# Patient Record
Sex: MALE | Race: WHITE | Employment: FULL TIME | URBAN - METROPOLITAN AREA
[De-identification: names, ages, dates, MRNs, and addresses within clinical notes are randomized per-mention and may not be internally consistent; named-entity substitution may affect disease eponyms.]

---

## 2017-07-11 ENCOUNTER — TELEPHONE (OUTPATIENT)
Dept: FAMILY MEDICINE | Facility: CLINIC | Age: 72
End: 2017-07-11

## 2017-07-11 NOTE — TELEPHONE ENCOUNTER
Panel Management Review      Patient has the following on his problem list:     Hypertension   Last three blood pressure readings:  BP Readings from Last 3 Encounters:   12/06/16 (!) 146/104   11/02/16 126/88   10/25/16 124/90     Blood pressure: FAILED    HTN Guidelines:  Age 18-59 BP range:  Less than 140/90  Age 60-85 with Diabetes:  Less than 140/90  Age 60-85 without Diabetes:  less than 150/90      Composite cancer screening  Chart review shows that this patient is due/due soon for the following None  Summary:    Patient is due/failing the following:   hypertension    Action needed:   Patient needs office visit for htn.    Type of outreach:    Sent letter.    Questions for provider review:    None                                                                                                                                    Tanisha BROOKE CMA ([facility])

## 2017-07-11 NOTE — LETTER
[address]  [place] [address]  Phone: [phone]            July 11, 2017          Surya Dior,  [address]  [facility] [address]        Dear Surya Dior      Monitoring and managing your preventative and chronic health conditions are very important to us. Our records indicate that you have not scheduled for Appointment with your provider for Blood Pressure which was recommended by Alona Benítez.      If you have received your health care elsewhere, please call the clinic so the information can be documented in your chart.    Please call [phone] or message us through your [employer] account to schedule an appointment or provide information for your chart.     Feel free to contact us if you have any questions or concerns!    I look forward to seeing you and working with you on your health care needs.     Sincerely,         Alona Benítez / Tanisha BROOKE CMA ([facility])

## 2017-10-17 ENCOUNTER — TELEPHONE (OUTPATIENT)
Dept: INTERNAL MEDICINE | Facility: CLINIC | Age: 72
End: 2017-10-17

## 2017-10-17 NOTE — LETTER
October 17, 2017          Surya Dior,  [address] [facility] [address]        Dear Surya Dior      Monitoring and managing your preventative and chronic health conditions are very important to us. Our records indicate that you have not scheduled for Appointment with your provider for blood pressure recheck  which was recommended by Alona Benítez.      If you have received your health care elsewhere, please call the clinic so the information can be documented in your chart.    Please call [phone] or message us through your [employer] account to schedule an appointment or provide information for your chart.     Feel free to contact us if you have any questions or concerns!    I look forward to seeing you and working with you on your health care needs.     Sincerely,         Alona Benítez / Tanisha BROOKE CMA ([facility])

## 2017-10-17 NOTE — TELEPHONE ENCOUNTER
Panel Management Review      Patient has the following on his problem list:     Hypertension   Last three blood pressure readings:  BP Readings from Last 3 Encounters:   12/06/16 (!) 146/104   11/02/16 126/88   10/25/16 124/90     Blood pressure: FAILED    HTN Guidelines:  Age 18-59 BP range:  Less than 140/90  Age 60-85 with Diabetes:  Less than 140/90  Age 60-85 without Diabetes:  less than 150/90        Composite cancer screening  Chart review shows that this patient is due/due soon for the following None  Summary:    Patient is due/failing the following:   BP CHECK, DAP and PHQ9    Action needed:   Patient needs office visit for Hypertension follow up.    Type of outreach:    Sent letter.    Questions for provider review:    None                                                                                                                                    Tanisha BROOKE CMA ([facility])       Chart routed to  .

## 2018-04-11 ENCOUNTER — OFFICE VISIT (OUTPATIENT)
Dept: UROLOGY | Facility: CLINIC | Age: 73
End: 2018-04-11
Payer: COMMERCIAL

## 2018-04-11 VITALS
DIASTOLIC BLOOD PRESSURE: 100 MMHG | WEIGHT: 210 LBS | OXYGEN SATURATION: 96 % | HEART RATE: 83 BPM | SYSTOLIC BLOOD PRESSURE: 152 MMHG | HEIGHT: 71 IN | BODY MASS INDEX: 29.4 KG/M2

## 2018-04-11 DIAGNOSIS — R35.0 BENIGN PROSTATIC HYPERPLASIA WITH URINARY FREQUENCY: Primary | ICD-10-CM

## 2018-04-11 DIAGNOSIS — N40.1 BENIGN PROSTATIC HYPERPLASIA WITH URINARY FREQUENCY: Primary | ICD-10-CM

## 2018-04-11 LAB
ALBUMIN UR-MCNC: NEGATIVE MG/DL
APPEARANCE UR: CLEAR
BILIRUB UR QL STRIP: NEGATIVE
COLOR UR AUTO: YELLOW
GLUCOSE UR STRIP-MCNC: NEGATIVE MG/DL
HGB UR QL STRIP: ABNORMAL
KETONES UR STRIP-MCNC: NEGATIVE MG/DL
LEUKOCYTE ESTERASE UR QL STRIP: NEGATIVE
NITRATE UR QL: NEGATIVE
PH UR STRIP: 5 PH (ref 5–7)
RESIDUAL VOLUME (RV) (EXTERNAL): 81
SOURCE: ABNORMAL
SP GR UR STRIP: 1.02 (ref 1–1.03)
UROBILINOGEN UR STRIP-ACNC: 0.2 EU/DL (ref 0.2–1)

## 2018-04-11 PROCEDURE — 51798 US URINE CAPACITY MEASURE: CPT | Performed by: UROLOGY

## 2018-04-11 PROCEDURE — 99202 OFFICE O/P NEW SF 15 MIN: CPT | Mod: 25 | Performed by: UROLOGY

## 2018-04-11 PROCEDURE — 81003 URINALYSIS AUTO W/O SCOPE: CPT | Performed by: UROLOGY

## 2018-04-11 RX ORDER — ASPIRIN 81 MG/1
81 TABLET, CHEWABLE ORAL
COMMUNITY

## 2018-04-11 RX ORDER — IBUPROFEN 200 MG
TABLET ORAL
COMMUNITY

## 2018-04-11 ASSESSMENT — PAIN SCALES - GENERAL: PAINLEVEL: NO PAIN (0)

## 2018-04-11 NOTE — PROGRESS NOTES
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 He is noticing a slower stream, daytime urinary frequency and nocturia ×1. He denies any dysuria or hematuria. There is no family history of prostate cancer. He's not had a PSA test or a recent rectal exam  Urinalysis today is normal and post void residual is 81 cc  Other past medical history: Benign colon polyps, osteoporosis, history of retinal detachment, eye surgeries, smoker. Sexually active  Medications: Tylenol, low-dose aspirin, glucosamine/chondroitin, ibuprofen, multivitamin, fish oil, vitamin D  Allergies: None  Exam: Normal appearance, normal vital signs, alert and oriented, normocephalic, normal respirations, neuro grossly intact. Normal sphincter tone, no rectal mass or impaction, benign feeling prostate, normal seminal vesicles  Assessment: BPH with mild obstructive symptoms. Some of his urinary frequency is probably related to caffeine use  Discussed different alpha blockers, side effects and follow-up  Plan: Yearly digital rectal exam. No PSA unless palpable abnormality. And alpha blocker if his symptoms progress-he might want to start with Cardura 4 mg daily rather than Flomax in order to reduce the risk of retrograde ejaculation.

## 2018-04-11 NOTE — MR AVS SNAPSHOT
"              After Visit Summary   2018    Surya Dior    MRN: [id number]           Patient Information     Date Of Birth          1945        Visit Information        Provider Department      2018 1:20 PM Josh Pretty MD [facility] [place]        Today's Diagnoses     Benign prostatic hyperplasia with urinary frequency    -  1       Follow-ups after your visit        Who to contact     If you have questions or need follow up information about today's clinic visit or your schedule please contact [facility] [place] directly at [phone].  Normal or non-critical lab and imaging results will be communicated to you by [employer], letter or phone within 4 business days after the clinic has received the results. If you do not hear from us within 7 days, please contact the clinic through [employer] or phone. If you have a critical or abnormal lab result, we will notify you by phone as soon as possible.  Submit refill requests through [employer] or call your pharmacy and they will forward the refill request to us. Please allow 3 business days for your refill to be completed.          Additional Information About Your Visit        MyChart Information     [employer] lets you send messages to your doctor, view your test results, renew your prescriptions, schedule appointments and more. To sign up, go to [email or web address] . Click on \"Log in\" on the left side of the screen, which will take you to the Welcome page. Then click on \"Sign up Now\" on the right side of the page.     You will be asked to enter the access code listed below, as well as some personal information. Please follow the directions to create your username and password.     Your access code is: [email or web address]  Expires: 7/10/2018  1:48 PM     Your access code will  in 90 days. If you need help or a new code, please call your [place] clinic or [phone].        Care " "EveryWhere ID     This is your Care EveryWhere ID. This could be used by other organizations to access your [place] medical records  [id number]        Your Vitals Were     Pulse Height Pulse Oximetry BMI (Body Mass Index)          83 1.791 m (5' 10.5\") 96% 29.71 kg/m2         Blood Pressure from Last 3 Encounters:   04/11/18 (!) 152/100   12/06/16 (!) 146/104   11/02/16 126/88    Weight from Last 3 Encounters:   04/11/18 95.3 kg (210 lb)   12/06/16 88 kg (194 lb)   10/25/16 86.6 kg (191 lb)              We Performed the Following     MEASURE POST-VOID RESIDUAL URINE/BLADDER CAPACITY, US NON-IMAGING (46701)     UA without Microscopic        Primary Care Provider Office Phone # Fax #    Leonor Rice [phone]        [facility] [address] [place] [address]        Equal Access to Services     GARO MARSHALL : Hadii aad ku hadasho Soomaali, waaxda luqadaha, qaybta kaalmada adeegyada, waxay idiin hayjalenn janice mccormick . So [facility] [phone].    ATENCIÓN: Si habla español, tiene a madsen disposición servicios gratuitos de asistencia lingüística. Christie al [phone].    We comply with applicable federal civil rights laws and [place] laws. We do not discriminate on the basis of race, color, national origin, age, disability, sex, sexual orientation, or gender identity.            Thank you!     Thank you for choosing [facility] [place]  for your care. Our goal is always to provide you with excellent care. Hearing back from our patients is one way we can continue to improve our services. Please take a few minutes to complete the written survey that you may receive in the mail after your visit with us. Thank you!             Your Updated Medication List - Protect others around you: Learn how to safely use, store and throw away your medicines at www.disposemymeds.org.          This list is accurate as of 4/11/18  1:48 PM.  Always use your most " recent med list.                   Brand Name Dispense Instructions for use Diagnosis    acetaminophen 325 MG tablet    TYLENOL     Take 325 mg by mouth every 6 hours as needed for mild pain 2 tablets        * aspirin 81 MG chewable tablet      Take 81 mg by mouth        * aspirin 81 MG tablet      Take  by mouth daily. 2 daily        glucosamine-chondroitin 500-400 MG Caps per capsule      Take 1 capsule by mouth every other day        HYDROcodone-acetaminophen 5-325 MG per tablet    NORCO    30 tablet    Take 1 tablet by mouth every 6 hours as needed    Chronic right-sided low back pain with right-sided sciatica       * ibuprofen 200 MG tablet    ADVIL/MOTRIN     Take 200 mg by mouth every 6 hours as needed for mild pain Taking 3 tablets        * ibuprofen 200 MG tablet    ADVIL/MOTRIN          multivitamin, therapeutic with minerals Tabs tablet      Take 1 tablet by mouth every other day        OMEGA-3 FISH OIL PO      Take by mouth every other day        order for DME     1 each    Equipment being ordered: CAM walker boot for right foot    Right ankle pain       VITAMIN D (CHOLECALCIFEROL) PO      Take 2,000 Units by mouth daily        * Notice:  This list has 4 medication(s) that are the same as other medications prescribed for you. Read the directions carefully, and ask your doctor or other care provider to review them with you.

## 2018-04-11 NOTE — LETTER
4/11/2018       RE: Surya Dior  [address] [facility] [address]     Dear Colleague,    Thank you for referring your patient, Surya Dior, to the [facility] [place] at [facility]. Please see a copy of my visit note below.    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 He is noticing a slower stream, daytime urinary frequency and nocturia ×1. He denies any dysuria or hematuria. There is no family history of prostate cancer. He's not had a PSA test or a recent rectal exam  Urinalysis today is normal and post void residual is 81 cc  Other past medical history: Benign colon polyps, osteoporosis, history of retinal detachment, eye surgeries, smoker. Sexually active  Medications: Tylenol, low-dose aspirin, glucosamine/chondroitin, ibuprofen, multivitamin, fish oil, vitamin D  Allergies: None  Exam: Normal appearance, normal vital signs, alert and oriented, normocephalic, normal respirations, neuro grossly intact. Normal sphincter tone, no rectal mass or impaction, benign feeling prostate, normal seminal vesicles  Assessment: BPH with mild obstructive symptoms. Some of his urinary frequency is probably related to caffeine use  Discussed different alpha blockers, side effects and follow-up  Plan: Yearly digital rectal exam. No PSA unless palpable abnormality. And alpha blocker if his symptoms progress-he might want to start with Cardura 4 mg daily rather than Flomax in order to reduce the risk of retrograde ejaculation.    Again, thank you for allowing me to participate in the care of your patient.      Sincerely,    Josh Pretty MD

## 2018-04-11 NOTE — NURSING NOTE
Chief Complaint   Patient presents with     Urinary Frequency     Pt. here for BPH       UA RESULTS:  Recent Labs   Lab Test  04/11/18   1308   COLOR  Yellow   APPEARANCE  Clear   URINEGLC  Negative   URINEBILI  Negative   URINEKETONE  Negative   SG  1.025   UBLD  Trace*   URINEPH  5.0   PROTEIN  Negative   UROBILINOGEN  0.2   NITRITE  Negative   LEUKEST  Negative     UA done today, urine showed trace of blood.    Susan Miranda, CMA

## 2019-08-16 ENCOUNTER — OFFICE VISIT (OUTPATIENT)
Dept: UROLOGY | Facility: CLINIC | Age: 74
End: 2019-08-16
Payer: MEDICARE

## 2019-08-16 VITALS
WEIGHT: 210 LBS | DIASTOLIC BLOOD PRESSURE: 94 MMHG | BODY MASS INDEX: 28.44 KG/M2 | HEART RATE: 80 BPM | HEIGHT: 72 IN | SYSTOLIC BLOOD PRESSURE: 158 MMHG

## 2019-08-16 DIAGNOSIS — N40.1 BENIGN PROSTATIC HYPERPLASIA WITH LOWER URINARY TRACT SYMPTOMS, SYMPTOM DETAILS UNSPECIFIED: Primary | ICD-10-CM

## 2019-08-16 LAB
ALBUMIN UR-MCNC: NEGATIVE MG/DL
APPEARANCE UR: CLEAR
BILIRUB UR QL STRIP: NEGATIVE
COLOR UR AUTO: YELLOW
GLUCOSE UR STRIP-MCNC: NEGATIVE MG/DL
HGB UR QL STRIP: NEGATIVE
KETONES UR STRIP-MCNC: NEGATIVE MG/DL
LEUKOCYTE ESTERASE UR QL STRIP: NEGATIVE
NITRATE UR QL: NEGATIVE
PH UR STRIP: 5 PH (ref 5–7)
RESULT: 89 ML
SOURCE: NORMAL
SP GR UR STRIP: 1.02 (ref 1–1.03)
UROBILINOGEN UR STRIP-ACNC: 0.2 EU/DL (ref 0.2–1)

## 2019-08-16 PROCEDURE — 51798 US URINE CAPACITY MEASURE: CPT | Performed by: UROLOGY

## 2019-08-16 PROCEDURE — 99213 OFFICE O/P EST LOW 20 MIN: CPT | Mod: 25 | Performed by: UROLOGY

## 2019-08-16 PROCEDURE — 81003 URINALYSIS AUTO W/O SCOPE: CPT | Performed by: UROLOGY

## 2019-08-16 ASSESSMENT — PAIN SCALES - GENERAL: PAINLEVEL: NO PAIN (0)

## 2019-08-16 ASSESSMENT — MIFFLIN-ST. JEOR: SCORE: 1740.55

## 2019-08-16 NOTE — LETTER
8/16/2019       RE: Surya Mirandaidlona  [address] [facility] [address] [facility] [address]     Dear Colleague,    Thank you for referring your patient, Surya Dior, to the [facility] [place] at [facility]. Please see a copy of my visit note below.    Surya Dior MD returns for follow-up of BPH.  He has a normal urinalysis this morning and a postvoid residual of 89 cc.  He is comfortable with his symptoms.  We discussed finasteride, alpha blockers for use in the future should his symptoms worsen or he develops any urinary urgency or his postvoid residual increases year to year.  He may require antihypertensive medication in the future-he would be a candidate for medications such as Cardura to treat both.  Other past medical history: Colonic polyps, osteoporosis, retinal detachment, maxillary osteotomy, lens implants, smokes cigars  No family history of prostate cancer  Medications: Tylenol, baby aspirin, glucosamine/chondroitin, ibuprofen, multivitamin, fish oil, vitamin D  Allergies: None  Review of systems: No dysuria or hematuria  Exam: Alert and oriented, normal appearance, normal vital signs.  Normal respirations, neuro grossly intact.  Normal sphincter tone, no rectal mass or impaction, small and symmetric prostate-feels benign.  Normal seminal vesicles  Assessment: Lower urinary tract symptoms, small prostate and 89 cc postvoid residual.  Not a candidate for Proscar because of small prostate size.  If symptoms are postvoid residual change in the future he would be a candidate for Cardura, Flomax, Uroxatrol.  Also discussed Cialis 5 mg daily  Plan: Yearly digital rectal exam, PVR.  PSA if palpable abnormality    Again, thank you for allowing me to participate in the care of your patient.      Sincerely,    Josh Pretty MD

## 2019-08-16 NOTE — PROGRESS NOTES
Surya Dior MD returns for follow-up of BPH.  He has a normal urinalysis this morning and a postvoid residual of 89 cc.  He is comfortable with his symptoms.  We discussed finasteride, alpha blockers for use in the future should his symptoms worsen or he develops any urinary urgency or his postvoid residual increases year to year.  He may require antihypertensive medication in the future-he would be a candidate for medications such as Cardura to treat both.  Other past medical history: Colonic polyps, osteoporosis, retinal detachment, maxillary osteotomy, lens implants, smokes cigars  No family history of prostate cancer  Medications: Tylenol, baby aspirin, glucosamine/chondroitin, ibuprofen, multivitamin, fish oil, vitamin D  Allergies: None  Review of systems: No dysuria or hematuria  Exam: Alert and oriented, normal appearance, normal vital signs.  Normal respirations, neuro grossly intact.  Normal sphincter tone, no rectal mass or impaction, small and symmetric prostate-feels benign.  Normal seminal vesicles  Assessment: Lower urinary tract symptoms, small prostate and 89 cc postvoid residual.  Not a candidate for Proscar because of small prostate size.  If symptoms are postvoid residual change in the future he would be a candidate for Cardura, Flomax, Uroxatrol.  Also discussed Cialis 5 mg daily  Plan: Yearly digital rectal exam, PVR.  PSA if palpable abnormality

## 2019-08-16 NOTE — NURSING NOTE
Chief Complaint   Patient presents with     Benign Prostatic Hypertrophy     pt is here due to BPH w/sx       Patient Active Problem List   Diagnosis     Overweight     Cataract     Tobacco use disorder     Hyperlipidemia LDL goal <100     Elevated blood pressure reading without diagnosis of hypertension     Family history of colon cancer     Snoring     Benign essential hypertension       No Known Allergies    BP (!) 158/94   Pulse 80   Ht 1.829 m (6')   Wt 95.3 kg (210 lb)   BMI 28.48 kg/m        PVR: 89 ml was found in the pt bladder by ultrasound.    Patient was informed, and cleaned after the Ultrasound      Lior Rosenberg EMT-B  8/16/2019

## 2020-10-05 ENCOUNTER — ALLIED HEALTH/NURSE VISIT (OUTPATIENT)
Dept: NURSING | Facility: CLINIC | Age: 75
End: 2020-10-05
Payer: MEDICARE

## 2020-10-05 DIAGNOSIS — Z23 NEED FOR PROPHYLACTIC VACCINATION AND INOCULATION AGAINST INFLUENZA: Primary | ICD-10-CM

## 2020-10-05 PROCEDURE — 90662 IIV NO PRSV INCREASED AG IM: CPT

## 2020-10-05 PROCEDURE — G0008 ADMIN INFLUENZA VIRUS VAC: HCPCS
